# Patient Record
Sex: FEMALE | Race: OTHER | Employment: OTHER | URBAN - METROPOLITAN AREA
[De-identification: names, ages, dates, MRNs, and addresses within clinical notes are randomized per-mention and may not be internally consistent; named-entity substitution may affect disease eponyms.]

---

## 2019-09-01 ENCOUNTER — HOSPITAL ENCOUNTER (EMERGENCY)
Facility: HOSPITAL | Age: 59
Discharge: HOME | End: 2019-09-01
Attending: EMERGENCY MEDICINE | Admitting: EMERGENCY MEDICINE
Payer: COMMERCIAL

## 2019-09-01 VITALS
WEIGHT: 128 LBS | TEMPERATURE: 98.2 F | DIASTOLIC BLOOD PRESSURE: 89 MMHG | HEART RATE: 82 BPM | BODY MASS INDEX: 23.55 KG/M2 | HEIGHT: 62 IN | OXYGEN SATURATION: 98 % | RESPIRATION RATE: 16 BRPM | SYSTOLIC BLOOD PRESSURE: 162 MMHG

## 2019-09-01 DIAGNOSIS — R51.9 FACIAL PAIN: ICD-10-CM

## 2019-09-01 DIAGNOSIS — B02.22 TRIGEMINAL HERPES ZOSTER: Primary | ICD-10-CM

## 2019-09-01 PROCEDURE — 99284 EMERGENCY DEPT VISIT MOD MDM: CPT | Performed by: EMERGENCY MEDICINE

## 2019-09-01 PROCEDURE — 96375 TX/PRO/DX INJ NEW DRUG ADDON: CPT

## 2019-09-01 PROCEDURE — 99283 EMERGENCY DEPT VISIT LOW MDM: CPT

## 2019-09-01 PROCEDURE — 96374 THER/PROPH/DIAG INJ IV PUSH: CPT

## 2019-09-01 RX ORDER — ACYCLOVIR 400 MG/1
400 TABLET ORAL 4 TIMES DAILY
Qty: 28 TABLET | Refills: 0 | Status: SHIPPED | OUTPATIENT
Start: 2019-09-01 | End: 2019-09-01 | Stop reason: SDUPTHER

## 2019-09-01 RX ORDER — ACYCLOVIR 200 MG/1
400 CAPSULE ORAL ONCE
Status: COMPLETED | OUTPATIENT
Start: 2019-09-01 | End: 2019-09-01

## 2019-09-01 RX ORDER — OXYCODONE HYDROCHLORIDE 5 MG/1
5 TABLET ORAL EVERY 6 HOURS PRN
Qty: 6 TABLET | Refills: 0 | Status: SHIPPED | OUTPATIENT
Start: 2019-09-01 | End: 2019-09-04

## 2019-09-01 RX ORDER — KETOROLAC TROMETHAMINE 30 MG/ML
15 INJECTION, SOLUTION INTRAMUSCULAR; INTRAVENOUS ONCE
Status: COMPLETED | OUTPATIENT
Start: 2019-09-01 | End: 2019-09-01

## 2019-09-01 RX ORDER — ACETAMINOPHEN 325 MG/1
650 TABLET ORAL ONCE
Status: COMPLETED | OUTPATIENT
Start: 2019-09-01 | End: 2019-09-01

## 2019-09-01 RX ORDER — ACYCLOVIR 400 MG/1
800 TABLET ORAL
Qty: 80 TABLET | Refills: 0 | Status: SHIPPED | OUTPATIENT
Start: 2019-09-01 | End: 2019-09-09

## 2019-09-01 RX ORDER — HYDROMORPHONE HCL/PF 1 MG/ML
1 SYRINGE (ML) INJECTION ONCE
Status: COMPLETED | OUTPATIENT
Start: 2019-09-01 | End: 2019-09-01

## 2019-09-01 RX ADMIN — KETOROLAC TROMETHAMINE 15 MG: 30 INJECTION, SOLUTION INTRAMUSCULAR at 14:32

## 2019-09-01 RX ADMIN — HYDROMORPHONE HYDROCHLORIDE 1 MG: 1 INJECTION, SOLUTION INTRAMUSCULAR; INTRAVENOUS; SUBCUTANEOUS at 14:32

## 2019-09-01 RX ADMIN — ACYCLOVIR 400 MG: 200 CAPSULE ORAL at 15:32

## 2019-09-01 RX ADMIN — ACETAMINOPHEN 650 MG: 325 TABLET ORAL at 14:20

## 2019-09-01 NOTE — ED PROVIDER NOTES
History  Chief Complaint   Patient presents with    Eye Swelling     Pt reports right sided orbit and forehead swelling x 2 days, denies trauma, denies thinners  HPI    17-year-old female pmhx hypertension, diabetes, hyperlipidemia presents for evaluation of right eye and forehead swelling  Patient says she noticed her right upper and lower eyelid appears swollen yesterday morning and have progressively worsened  She also noted her right forehead appeared mildly swollen with erythema and scab-like lesions  She also notes the right side of her upper face and scalp are tender to palpation  Pain is a 9/10  Patient denies any falls or trauma  She notes intermittent visual blurriness  She denies any discharge from her eye  Patient denies any headache, fever, chills, chest pain, shortness of breath, nausea, vomiting, abdominal pain, diarrhea, dysuria, extremity weakness/numbness tingling  For patient says she just arrived in [place] about 3 days ago due to her brother being in the ICU, coming from [place]  Patient says she has a primary care physician she follows with back in [place]  None       History reviewed  No pertinent past medical history  History reviewed  No pertinent surgical history  History reviewed  No pertinent family history  I have reviewed and agree with the history as documented  Social History     Tobacco Use    Smoking status: Not on file   Substance Use Topics    Alcohol use: Not on file    Drug use: Not on file        Review of Systems   Constitutional: Negative for chills, diaphoresis, fatigue and fever  HENT: Negative for congestion, rhinorrhea and sore throat  Eyes: Positive for pain and visual disturbance  Negative for photophobia, discharge and itching  Respiratory: Negative for cough, chest tightness and shortness of breath  Cardiovascular: Negative for chest pain and palpitations  Gastrointestinal: Positive for constipation   Negative for abdominal pain, blood in stool, diarrhea, nausea and vomiting  Genitourinary: Negative for decreased urine volume, dysuria, frequency and hematuria  Musculoskeletal: Negative for back pain, gait problem, myalgias, neck pain and neck stiffness  Skin: Positive for rash  Negative for pallor  Neurological: Positive for headaches  Negative for dizziness, syncope, weakness, light-headedness and numbness  Hematological: Negative for adenopathy  Does not bruise/bleed easily  All other systems reviewed and are negative  Physical Exam  ED Triage Vitals [09/01/19 1219]   Temperature Pulse Respirations Blood Pressure SpO2   98 2 °F (36 8 °C) 98 18 159/72 96 %      Temp Source Heart Rate Source Patient Position - Orthostatic VS BP Location FiO2 (%)   Oral Monitor Lying Right arm --      Pain Score       Worst Possible Pain             Orthostatic Vital Signs  Vitals:    09/01/19 1219 09/01/19 1230 09/01/19 1400   BP: 159/72 158/80 162/89   Pulse: 98 86 82   Patient Position - Orthostatic VS: Lying         Physical Exam   Constitutional: She is oriented to person, place, and time  Vital signs are normal  She appears well-developed and well-nourished  No distress  Patient is alert and oriented, appears nontoxic, in no acute distress   HENT:   Head: Normocephalic and atraumatic  Head is with right periorbital erythema  Mouth/Throat: Oropharynx is clear and moist  No oropharyngeal exudate  Eyes: Pupils are equal, round, and reactive to light  Conjunctivae and EOM are normal  Right eye exhibits no discharge  Left eye exhibits no discharge  No scleral icterus  Vision grossly intact   Neck: Normal range of motion  Neck supple  Cardiovascular: Normal rate, regular rhythm, normal heart sounds and intact distal pulses  Pulmonary/Chest: Effort normal and breath sounds normal  No respiratory distress  Abdominal: Soft  Bowel sounds are normal  She exhibits no distension  There is no tenderness     Musculoskeletal: Normal range of motion  She exhibits no deformity  Lymphadenopathy:     She has no cervical adenopathy  Neurological: She is alert and oriented to person, place, and time  CN 2-12 intact, full ROM of upper and lower extremities, muscle strength 5/5 throughout, DTRs normal, sensation intact throughout, negative finger to nose/Audra, no gait disturbance noted   Skin: Skin is warm and dry  Capillary refill takes less than 2 seconds  Rash noted  She is not diaphoretic  No erythema  No pallor  Psychiatric: She has a normal mood and affect  Her behavior is normal  Judgment and thought content normal    Nursing note and vitals reviewed  ED Medications  Medications   acetaminophen (TYLENOL) tablet 650 mg (650 mg Oral Given 9/1/19 1420)   ketorolac (TORADOL) injection 15 mg (15 mg Intravenous Given 9/1/19 1432)   HYDROmorphone (DILAUDID) injection 1 mg (1 mg Intravenous Given 9/1/19 1432)   acyclovir (ZOVIRAX) capsule 400 mg (400 mg Oral Given 9/1/19 1532)       Diagnostic Studies  Results Reviewed     None                 No orders to display         Procedures  Procedures        ED Course  ED Course as of Sep 02 0920   Sun Sep 01, 2019   1508 Patient declined peripheral IV after two attempts made      1606 Patient notes improvement of pain, will discharge with acyclovir and pain medication                                   MDM  Number of Diagnoses or Management Options  Facial pain:   Trigeminal herpes zoster:   Diagnosis management comments: 47-year-old female presents for evaluation of right upper facial rash and periorbital edema  Rash appears to be shingles  Patient is hemodynamically stable, afebrile, and otherwise appears clinically well  Initially labs were ordered and peripheral IV however IV access was attempted twice and were unsuccessful  Patient then declined peripheral IV  Patient was treated with acyclovir dose and IM Dilaudid for pain   Instructed patient to fill prescription for acyclovir and oxycodone today  Return precautions were thoroughly discussed with patient and instructed patient to follow up with her PCP when she gets back home  Disposition  Final diagnoses:   Trigeminal herpes zoster   Facial pain     Time reflects when diagnosis was documented in both MDM as applicable and the Disposition within this note     Time User Action Codes Description Comment    9/1/2019  4:10 PM Obeymichael Burtonez Add [B02 22] Trigeminal herpes zoster     9/1/2019  4:10 PM Reno Rubio Add [R51] Facial pain       ED Disposition     ED Disposition Condition Date/Time Comment    Discharge [place] Sep 1, 2019  4:09 PM Modesta Mortimer discharge to home/self care  Follow-up Information     Follow up With Specialties Details Why Contact Info Additional [address] Internal Medicine  PLEASE FOLLOW UP WITH YOUR PCP AS SOON AS YOU CAN ON ARRIVAL [facility] TO [address] [address]  [address] [place] [address], [phone]          Discharge Medication List as of 9/1/2019  4:13 PM      START taking these medications    Details   oxyCODONE (ROXICODONE) 5 mg immediate release tablet Take 1 tablet (5 mg total) by mouth every 6 (six) hours as needed for moderate pain for up to 3 daysMax Daily Amount: 20 mg, Starting Sun 9/1/2019, Until Wed 9/4/2019, Print      acyclovir (ZOVIRAX) 400 MG tablet Take 1 tablet (400 mg total) by mouth 4 (four) times a day for 7 days, Starting Sun 9/1/2019, Until Sun 9/8/2019, Print           No discharge procedures on file  ED Provider  Attending physically available and evaluated Modesta Mortimer I managed the patient along with the ED Attending      Electronically Signed by         Nirmal Garg MD  09/02/19 5151

## 2019-09-01 NOTE — ED ATTENDING ATTESTATION
Rubén Alvarez MD, saw and evaluated the patient  I have discussed the patient with the resident/non-physician practitioner and agree with the resident's/non-physician practitioner's findings, Plan of Care, and MDM as documented in the resident's/non-physician practitioner's note, except where noted  All available labs and Radiology studies were reviewed  I was present for key portions of any procedure(s) performed by the resident/non-physician practitioner and I was immediately available to provide assistance  At this point I agree with the current assessment done in the Emergency Department  I have conducted an independent evaluation of this patient a history and physical is as follows:     Pt has r eye swelling forehead pain for 2 days  No trauma to area pt is visiting form [place]   No recent uri or cough Lesions noted over the skin PE: alert nad there are scabbed lesions in distribution of V1 neuro alert cn intact motor 5/5 sensation wnl no involvement tip of nose MDM: will treat zoster  Critical Care Time  Procedures